# Patient Record
Sex: FEMALE | Race: WHITE | NOT HISPANIC OR LATINO | URBAN - METROPOLITAN AREA
[De-identification: names, ages, dates, MRNs, and addresses within clinical notes are randomized per-mention and may not be internally consistent; named-entity substitution may affect disease eponyms.]

---

## 2017-01-01 ENCOUNTER — OFFICE VISIT - RIVER FALLS (OUTPATIENT)
Dept: FAMILY MEDICINE | Facility: CLINIC | Age: 82
End: 2017-01-01

## 2018-01-01 ENCOUNTER — OFFICE VISIT - RIVER FALLS (OUTPATIENT)
Dept: FAMILY MEDICINE | Facility: CLINIC | Age: 83
End: 2018-01-01

## 2022-02-12 VITALS
SYSTOLIC BLOOD PRESSURE: 178 MMHG | TEMPERATURE: 99 F | OXYGEN SATURATION: 94 % | HEART RATE: 88 BPM | DIASTOLIC BLOOD PRESSURE: 82 MMHG

## 2022-02-12 VITALS
DIASTOLIC BLOOD PRESSURE: 82 MMHG | HEART RATE: 63 BPM | TEMPERATURE: 98.1 F | SYSTOLIC BLOOD PRESSURE: 142 MMHG | OXYGEN SATURATION: 95 %

## 2022-02-15 NOTE — PROGRESS NOTES
Chief Complaint    Patient is here for sore on right big toe. Noticed this morning.  History of Present Illness      Has been on hospice for COPD about a year.       Has inflamed toe. Noticed redness this morning. Having some pain in right great toe this morning.       Daughter-in-law and hospice nurse present today  Review of Systems      No fevers       No vomiting  Physical Exam   Vitals & Measurements    T: 98.1(Tympanic)  HR: 63(Peripheral)  BP: 142/82  SpO2: 95%       General: No acute distress      Musculoskeletal: Sitting in wheelchair      Heart: Regular rate and rhythm      Skin: Mild redness and swelling around the right great toe MTP joint.         Musculoskeletal: Good range of motion without tenderness or pain.  Does have some local tenderness at the right MTP joint.  Assessment/Plan   Right toe pain: Possible gout and superficial cellulitis.  Doubt septic arthritis.  Will start Keflex 500 mg twice daily due to renal function Crcl 25ml/min and prednisone 20 mg daily for 7 days.  Follow-up if not improving.  Patient Information     Name:BOWEN PEREZ      Address:      [address] [place] [id number]     Sex:Female     YOB: 1924     [phone]     Emergency Contact:NAVIN REEDER     MRN:[id number]     FIN:[id number]     Location:[facility]     Date of Service:01/20/2018      Primary Care Physician:       Berhane Nicole MD, [phone]  Medications        ALPRAZolam 0.25 mg oral tablet: 0.25 mg, 1 tab(s), PO, tid, PRN: for anxiety, 90 tab(s), 0 Refill(s).        EpiPen 2-Alejandro 0.3 mg injectable kit: 0.3 mg, im, once, PRN: allergic reaction, 2 EA, 1 Refill(s).        HYDROmorphone 1 mg/mL oral liquid: 1 mg, 1 mL, po, q2 hr (int), PRN: for pain or SOB, 30 mL, 0 Refill(s).        Asprin 81mg: See Instructions, TAKE ONE TABLET BY MOUTH EVERY MORNING, 48 unknown unit, 3 Refill(s).        albuterol-ipratropium 2.5 mg-0.5 mg/3 mL inhalation  solution: See Instructions, INHALE 1 VIAL BY NEBULIZER FOUR TIMES A DAY FOR SHORTNESS OF BREATH OR FOR WHEEZING, 180 unknown unit.        amLODIPine 2.5 mg oral tablet: 2.5 mg, 1 tab(s), po, bid, for 60 day(s), 120 tab(s), 0 Refill(s).        aspirin 81 mg oral tablet: 81 mg, 1 tab(s), PO, Daily.        Qvar with Dose Counter 80 mcg/inh inhalation aerosol: 2 puff(s), inh, bid, 1 EA, 1 Refill(s).        bumetanide 1 mg oral tablet: See Instructions, TAKE ONE TABLET BY MOUTH TWICE DAILY, 28 unknown unit.        cloNIDine 0.1 mg oral tablet: 0.1 mg, 1 tab(s), po, tid, 90 tab(s), 0 Refill(s).        Benadryl 25 mg oral tablet: 25 mg, 1 tab(s), PO, hs, PRN: for insomnia, 30 tab(s), 5 Refill(s).        furosemide 40 mg oral tablet: See Instructions, TAKE ONE TABLET BY MOUTH EVERY MORNING, 30 unknown unit, 2 Refill(s).        Mucinex 600 mg oral tablet, extended release: 600 mg, 1 tab(s), po, bid, 60 tab(s), 1 Refill(s).        isosorbide mononitrate 30 mg oral tablet, extended release: 30 mg, 1 tab(s), po, qam, 30 tab(s), 0 Refill(s).        levothyroxine 75 mcg (0.075 mg) oral tablet: 75 mcg, 1 tab(s), po, daily, for 90 day(s), 90 tab(s), 1 Refill(s).        Melatonin 3 mg oral tablet: See Instructions, TAKE ONE TABLET BY MOUTH AT BEDTIME, 30 unknown unit, 6 Refill(s).        metOLazone 2.5 mg oral tablet: See Instructions, TAKE ONE TABLET BY MOUTH ONCE WEEKLY -BOTTLE, 4 unknown unit.        mirtazapine 30 mg oral tablet: See Instructions, TAKE ONE TABLET BY MOUTH AT BEDTIME, 15 unknown unit.        morphine 20 mg/mL oral concentrate: 5 mg, 0.25 mL, PO, q2 hr (int), prn SOB/pain, PRN: for pain, 120 mL, 0 Refill(s).        omeprazole 20 mg oral delayed release capsule: 20 mg, 1 cap(s), po, qam, for 90 day(s), 90 cap(s), 1 Refill(s).        sertraline 50 mg oral tablet: 50 mg, 1 tab(s), po, daily, 30 tab(s), 0 Refill(s).                Allergies    Cardura (Angioedema)    Chocolate    FLUoxetine (nausea and body tingling  and not feeling well)    Provera (rash)    caffeine    codeine    morphine (Itching)    ramipril (Throat swelling.)    sulfa drugs

## 2022-02-15 NOTE — PROGRESS NOTES
Patient:   BOWEN PEREZ            MRN: [id number]            FIN: [id number]               Age:   89 years     Sex:  Female     :  1924   Associated Diagnoses:   COPD (Chronic Obstructive Pulmonary Disease)   Author:   Berhane Nicole MD      Visit Information      Date of Service: 2017 04:43 pm  Performing Location: [facility]  Encounter#: [id number]      Primary Care Provider (PCP):  Berhane Nicole MD    NPI# [id number]      Referring Provider:  No referring provider recorded for selected visit.      Chief Complaint   2017 5:01 PM CST     Nausea, vomitting, SOB, decreased energy.  Would like to increase O2.  Currently on 2-2.25 LPM.      History of Present Illness   Had episode of vomiting with low-grade temp over the last day.  Some increased shortness of breath.  Would like more oxygen although her sats have been reasonable.  Getting very tired and fatigued and confused at times.  Continues with her aggressive pulmonary treatment.       Review of Systems   Constitutional:  Negative except as documented in history of present illness.    Ear/Nose/Mouth/Throat:  Negative.    Respiratory:  Negative except as documented in history of present illness.    Cardiovascular:  Negative.    Gastrointestinal:  Negative.    Genitourinary:  Negative.    Musculoskeletal:  Negative.    Integumentary:  Negative.    Neurologic:  Negative.             Health Status   Allergies:    Allergic Reactions (Selected)  Severity Not Documented  Cardura (Angioedema)  Codeine (No reactions were documented)  FLUoxetine (Nausea and body tingling and not feeling well)  Provera (Rash)  Ramipril (Throat swelling.)  Sulfa drugs (No reactions were documented)  Nonallergic Reactions (Selected)  Severity Not Documented  Caffeine (No reactions were documented)  Chocolate (No reactions were documented)   Medications:  (Selected)   Prescriptions  Prescribed  ALPRAZolam 0.25 mg oral tablet: 1 tab(s) ( 0.25 mg ), PO,  TID, PRN: for anxiety, # 30 tab(s), 0 Refill(s), Type: Maintenance, called to pharmacy (Rx)  Asprin 81mg: Asprin 81mg, See Instructions, Instructions: TAKE ONE TABLET BY MOUTH EVERY MORNING, Compound, # 48 unknown unit, 3 Refill(s), Type: Maintenance, Pharmacy: [facility], TAKE ONE TABLET BY MOUTH EVERY MORNING  Benadryl 25 mg oral tablet: 1 tab(s) ( 25 mg ), PO, hs, PRN: for insomnia, # 30 tab(s), 5 Refill(s), Type: Maintenance, Pharmacy: [facility], Please add to bed time bubble packs.  thansk!, 1 tab(s) po hs,PRN:for insomnia  EpiPen 2-Alejandro 0.3 mg injectable kit: ( 0.3 mg ), im, once, PRN: allergic reaction, # 2 EA, 1 Refill(s), Type: Soft Stop, Pharmacy: [facility], 0.3 mg im once,PRN:allergic reaction  Melatonin 3 mg oral tablet: See Instructions, Instructions: TAKE ONE TABLET BY MOUTH AT BEDTIME, # 30 unknown unit, 6 Refill(s), Type: Soft Stop, Pharmacy: [facility], TAKE ONE TABLET BY MOUTH AT BEDTIME  Qvar 80 mcg/inh inhalation aerosol: 2 puff(s), inh, bid, # 1 EA, 11 Refill(s), Type: Maintenance, Pharmacy: [facility], 2 puff(s) inh bid  Zoloft 50 mg oral tablet: 1 tab(s) ( 50 mg ), PO, Daily, # 90 tab(s), 3 Refill(s), Type: Maintenance  albuterol-ipratropium 2.5 mg-0.5 mg/3 mL inhalation solution: 3 mL, inh, qid, # 1,080 mL, 3 Refill(s), Type: Maintenance, Pharmacy: [facility], 3 mL inh qid  amLODIPine 2.5 mg oral tablet: 1 tab(s) ( 2.5 mg ), po, bid, # 180 tab(s), 5 Refill(s), Type: Soft Stop, Pharmacy: [facility], 1 tab(s) po bid  cloNIDine 0.1 mg oral tablet: 1 tab(s) ( 0.1 mg ), po, tid, # 90 tab(s), 5 Refill(s), Type: Maintenance, Pharmacy: [facility]  furosemide 20 mg oral tablet: 1 tab(s) ( 20 mg ), po, bid, # 60 tab(s), 5 Refill(s), Type: Maintenance, Pharmacy: [facility], 1 tab(s) po bid  isosorbide mononitrate 30 mg oral tablet, extended release: 1 tab(s) ( 30 mg ), po, daily, # 30 tab(s), 5 Refill(s), Type: Maintenance, Pharmacy: [facility], 1 tab(s) po daily  levothyroxine 75  mcg (0.075 mg) oral tablet: 1 tab(s) ( 75 mcg ), po, daily, # 30 tab(s), 5 Refill(s), Type: Soft Stop, Pharmacy: [facility], 1 tab(s) po daily  omeprazole 20 mg oral delayed release capsule: 1 cap(s) ( 20 mg ), po, daily, # 90 cap(s), 1 Refill(s), Type: Maintenance, Pharmacy: [facility], 1 cap(s) po daily,x90 day(s)  Documented Medications  Documented  aspirin 81 mg oral tablet: 1 tab(s) ( 81 mg ), PO, Daily, 0 Refill(s)   Problem list:    All Problems (Selected)  Arthritis / ICD-9-.90 / Confirmed  Hypertension / SNOMED CT 4763958291 / Confirmed  Diverticulosis / ICD-9-.10 / Confirmed  Hypothyroid / ICD-9-.9 / Confirmed  Goiter / ICD-9-.9 / Confirmed  Insomnia / SNOMED CT 182302064 / Confirmed  COPD (Chronic Obstructive Pulmonary Disease) / SNOMED CT 00020588 / Confirmed  Chronic renal failure, 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  Arthritis (716.90)  Hypertension (2956975996)  Diverticulosis (562.10)  Hypothyroid (244.9)  Goiter (240.9)  Comments:  1/26/2010 CST 9:07 AM CST - Ewelina Bonnie ESCUDERO  as a child  Diastolic CHF (5682675396)  Paroxysmal SVT (supraventricular tachycardia) (432130231)  Hypercarbia (188112416)  Resolved  Inpatient stay (001898227): Onset on 11/25/2016 at 89 years.  Resolved on 11/26/2016 at 89 years.  Comments:  11/29/2016 CST 7:51 AM CST - Daria Catalan  @[facility] - Withdrawal from benzodiazepine  Inpatient stay (373154434): Onset on 11/11/2016 at 89 years.  Resolved on 11/14/2016 at 89 years.  Comments:  11/22/2016 CST 10:37 PM JUAN CARLOS - Daria Catalan  @[facility] - Concussion  Inpatient  stay ([id number]): Onset on 5/16/2016 at 89 years.  Resolved on 5/18/2016 at 89 years.  Comments:  11/22/2016 CST 10:38 PM JUAN CARLOS Daria Forte  @[facility] - COPD  Inpatient stay ([id number]): Onset on 12/25/2013 at 89 years.  Resolved on 12/30/2013 at 89 years.  Comments:  11/22/2016 CST 10:38 PM JUAN CARLOS Catalan Heatherri  @[facility] - COPD exacerbation with possible pneumonia  Inpatient stay (700334495): Onset on 7/9/2012 at 87 years.  Resolved.  Comments:  11/22/2016 CST 10:37 PM JUAN CARLOS Catalan Heatherri  @[facility] Angioedema  Tobacco abuse (305.1): Onset in 1998 at 73 years.  Resolved on 6/11/2010 at 85 years.   Family History:    Osteoporosis  Mother     Procedure history:    Colonoscopy (SNOMED CT 560916791) in the month of 5/2004 at 79 Years.  Cholecystectomy (SNOMED CT 08715029) on 2/22/2000 at 75 Years.  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  Bilateral salpingo-oophorectomy (SNOMED CT 21160205) in the month of 7/1967 at 42 Years.  Appendectomy (SNOMED CT 030817600) in the month of 7/1967 at 42 Years.  Dilation and curettage (SNOMED CT 19958945) in the month of 3/1967 at 42 Years.   Social History:        Alcohol Assessment: Denies Alcohol Use      Tobacco Assessment: Past            Past                     Comments:                      06/11/2010 - [place]                     quit 1998        Physical Examination   Vital Signs   2/8/2017 5:01 PM CST Temperature Tympanic 99.0 DegF    Peripheral Pulse Rate 88 bpm    Systolic Blood Pressure 178 mmHg  HI    Diastolic Blood Pressure 82 mmHg    Mean Arterial Pressure 114 mmHg    Oxygen Saturation 94 %      General:  Alert and oriented, No acute distress.    Eye:  Pupils are equal, round and reactive to light, Extraocular movements are intact.    HENT:  No pharyngeal erythema.    Neck:  Supple, Non-tender.    Respiratory:       Breath sounds: Bilateral, Diminished.    Cardiovascular:  Normal rate, Normal peripheral perfusion.    Gastrointestinal:  Soft,  Non-tender.    Musculoskeletal:  degenerative changes noted.    Neurologic:  Alert, Oriented.       Review / Management   Radiology results   X-ray, Reveals no change from previous      Impression and Plan   Diagnosis     COPD (Chronic Obstructive Pulmonary Disease) ([id number] J44.9).     Course:  Worsening.    Plan:  End-stage oxygen dependent COPD very frustrated with her current quality of life.  I think that given her advanced disease [facility] hospice is in order which we discussed with her and family.  We will put in referral.  No obvious changes on exam today given the episode last night Likely viral but not clear-cut. .    Patient Instructions:       Counseled: Patient, Family, Regarding diagnosis, Regarding treatment.